# Patient Record
Sex: FEMALE | Race: WHITE | NOT HISPANIC OR LATINO | Employment: UNEMPLOYED | URBAN - METROPOLITAN AREA
[De-identification: names, ages, dates, MRNs, and addresses within clinical notes are randomized per-mention and may not be internally consistent; named-entity substitution may affect disease eponyms.]

---

## 2023-08-28 ENCOUNTER — HOSPITAL ENCOUNTER (OUTPATIENT)
Dept: DATA CONVERSION | Facility: HOSPITAL | Age: 68
End: 2023-08-28

## 2023-09-23 PROBLEM — M79.2 NEUROPATHIC PAIN: Status: ACTIVE | Noted: 2023-09-23

## 2023-09-23 PROBLEM — G89.29 CHRONIC IDIOPATHIC PAIN SYNDROME: Status: ACTIVE | Noted: 2023-09-23

## 2023-09-23 PROBLEM — F45.42 PAIN DISORDER ASSOCIATED WITH PSYCHOLOGICAL AND PHYSICAL FACTORS: Status: ACTIVE | Noted: 2023-09-23

## 2023-09-23 PROBLEM — K44.9 HIATAL HERNIA: Status: ACTIVE | Noted: 2023-09-23

## 2023-09-23 PROBLEM — I87.1 MAY-THURNER SYNDROME: Status: ACTIVE | Noted: 2023-09-23

## 2023-09-23 PROBLEM — N94.89 PELVIC CONGESTION SYNDROME: Status: ACTIVE | Noted: 2023-09-23

## 2023-09-23 PROBLEM — R29.2 BRISK DEEP TENDON REFLEXES: Status: ACTIVE | Noted: 2023-09-23

## 2023-09-23 PROBLEM — M81.0 OSTEOPOROSIS: Status: ACTIVE | Noted: 2023-09-23

## 2023-09-23 PROBLEM — G90.529: Status: ACTIVE | Noted: 2023-09-23

## 2023-09-23 PROBLEM — M54.10 RADICULOPATHY OF LEG: Status: ACTIVE | Noted: 2023-09-23

## 2023-09-23 PROBLEM — G57.02 PIRIFORMIS SYNDROME OF LEFT SIDE: Status: ACTIVE | Noted: 2023-09-23

## 2023-09-23 PROBLEM — G57.72 COMPLEX REGIONAL PAIN SYNDROME TYPE 2 OF LEFT LOWER EXTREMITY: Status: ACTIVE | Noted: 2023-09-23

## 2023-09-23 PROBLEM — F32.A DEPRESSIVE EPISODE: Status: ACTIVE | Noted: 2023-09-23

## 2023-09-23 RX ORDER — DULOXETIN HYDROCHLORIDE 30 MG/1
30 CAPSULE, DELAYED RELEASE ORAL DAILY
COMMUNITY
Start: 2023-06-01 | End: 2024-01-29 | Stop reason: WASHOUT

## 2023-09-23 RX ORDER — LISINOPRIL 10 MG/1
10 TABLET ORAL AS NEEDED
COMMUNITY
Start: 2021-01-28 | End: 2023-12-12 | Stop reason: ALTCHOICE

## 2023-09-23 RX ORDER — ACETAMINOPHEN 500 MG
1 TABLET ORAL EVERY MORNING
COMMUNITY

## 2023-09-23 RX ORDER — GUAIFENESIN 1200 MG
325 TABLET, EXTENDED RELEASE 12 HR ORAL
COMMUNITY

## 2023-09-23 RX ORDER — METOPROLOL TARTRATE 25 MG/1
12.5 TABLET, FILM COATED ORAL 2 TIMES DAILY
COMMUNITY
Start: 2022-12-06 | End: 2023-12-15 | Stop reason: SDUPTHER

## 2023-09-23 RX ORDER — LISINOPRIL 5 MG/1
2.5 TABLET ORAL EVERY MORNING
COMMUNITY
Start: 2023-03-16 | End: 2023-12-12 | Stop reason: SINTOL

## 2023-09-23 RX ORDER — DULOXETIN HYDROCHLORIDE 20 MG/1
20 CAPSULE, DELAYED RELEASE ORAL DAILY
COMMUNITY
Start: 2023-09-08 | End: 2024-01-29 | Stop reason: WASHOUT

## 2023-09-23 RX ORDER — LORAZEPAM 0.5 MG/1
0.5 TABLET ORAL 3 TIMES DAILY PRN
COMMUNITY
Start: 2022-02-03

## 2023-09-23 RX ORDER — GABAPENTIN 100 MG/1
CAPSULE ORAL 2 TIMES DAILY
COMMUNITY
Start: 2020-12-17

## 2023-09-23 RX ORDER — MULTIVITAMIN
1 TABLET ORAL EVERY MORNING
COMMUNITY

## 2023-10-09 ENCOUNTER — OFFICE VISIT (OUTPATIENT)
Dept: PAIN MEDICINE | Facility: CLINIC | Age: 68
End: 2023-10-09
Payer: COMMERCIAL

## 2023-10-09 VITALS
BODY MASS INDEX: 19.67 KG/M2 | HEIGHT: 63 IN | DIASTOLIC BLOOD PRESSURE: 76 MMHG | RESPIRATION RATE: 16 BRPM | SYSTOLIC BLOOD PRESSURE: 142 MMHG | WEIGHT: 111 LBS

## 2023-10-09 DIAGNOSIS — G03.9 ARACHNOIDITIS (HHS-HCC): Primary | ICD-10-CM

## 2023-10-09 DIAGNOSIS — M53.3 SACRAL PAIN: ICD-10-CM

## 2023-10-09 DIAGNOSIS — G96.191 TARLOV CYST: ICD-10-CM

## 2023-10-09 DIAGNOSIS — G90.529 COMPLEX REGIONAL PAIN SYNDROME TYPE 1 AFFECTING PELVIC REGION AND THIGH: ICD-10-CM

## 2023-10-09 DIAGNOSIS — G89.29 OTHER CHRONIC PAIN: ICD-10-CM

## 2023-10-09 PROCEDURE — 99215 OFFICE O/P EST HI 40 MIN: CPT | Performed by: ANESTHESIOLOGY

## 2023-10-09 PROCEDURE — 1159F MED LIST DOCD IN RCRD: CPT | Performed by: ANESTHESIOLOGY

## 2023-10-09 PROCEDURE — 1036F TOBACCO NON-USER: CPT | Performed by: ANESTHESIOLOGY

## 2023-10-09 PROCEDURE — 1160F RVW MEDS BY RX/DR IN RCRD: CPT | Performed by: ANESTHESIOLOGY

## 2023-10-09 PROCEDURE — 1125F AMNT PAIN NOTED PAIN PRSNT: CPT | Performed by: ANESTHESIOLOGY

## 2023-10-09 ASSESSMENT — ENCOUNTER SYMPTOMS
PSYCHIATRIC NEGATIVE: 1
CONSTITUTIONAL NEGATIVE: 1
RESPIRATORY NEGATIVE: 1
HEMATOLOGIC/LYMPHATIC NEGATIVE: 1
ALLERGIC/IMMUNOLOGIC NEGATIVE: 1
ENDOCRINE NEGATIVE: 1
GASTROINTESTINAL NEGATIVE: 1
CARDIOVASCULAR NEGATIVE: 1
EYES NEGATIVE: 1

## 2023-10-09 ASSESSMENT — PAIN - FUNCTIONAL ASSESSMENT: PAIN_FUNCTIONAL_ASSESSMENT: 0-10

## 2023-10-09 ASSESSMENT — PATIENT HEALTH QUESTIONNAIRE - PHQ9
SUM OF ALL RESPONSES TO PHQ9 QUESTIONS 1 AND 2: 0
2. FEELING DOWN, DEPRESSED OR HOPELESS: NOT AT ALL
1. LITTLE INTEREST OR PLEASURE IN DOING THINGS: NOT AT ALL

## 2023-10-09 ASSESSMENT — PAIN SCALES - GENERAL
PAINLEVEL_OUTOF10: 7
PAINLEVEL: 7
PAINLEVEL_OUTOF10: 7

## 2023-10-09 ASSESSMENT — LIFESTYLE VARIABLES: TOTAL SCORE: 0

## 2023-10-09 NOTE — PROGRESS NOTES
Subjective   Patient ID: Lovely Luna is a 68 y.o. female who presents for left buttock pain.  HPI  Patient here today for a new patient evalaution of her left buttock, sacral, and left pain and numbness.  In 2018 she had a L3/4 disc herniation and she had MORTEZA with Dr. Riley as well as PT and she started to get better.  The groin and anterior thigh pain got better.  However, she started having left buttock painthis point.  She did a significant amount of PT.  She had a left piriformis injection with Dr. Jones, which was not helpful.  She developed swelling over the left gluteal area.  She transferred over to [facility] and was seeing Dr. Gordon.  She had a Middle Cluneal nerve block as well as a S2 nerve block.  She also had a glute med block as well and she pain free for 30 min.  She got worse after the second injection.  Dr. Gordon then suggested a second opinion.  She then saw Dr. Silvestre Razo got a new MRI which did show a arachnoiditis which was not present before the the MORTEZA she had by Dr. Riley.  She had a new Mri and had S1 and S2 tarlov cysts on the left side.  She went to [place] and saw Dr. Teran who did offer her a large surgery because of the amount of pain.      Her pain is a majority in the buttock which is medial close to the sacrum and down the medial buttock.  She also has some pain and tightness int he left lateral thigh into the lateral thigh.  She has limited hip ROM.  She has very limited hip flexion at this point.  She has had extensive MRI and no other pathology has been seen.  She had laser therapy and it was not helpful.  She had a consult with Dr. Looney and discussed SCS with her.     She has tried numerous medications including several antieptleptics and muscle relaxants.       Review of Systems   Constitutional: Negative.    HENT: Negative.     Eyes: Negative.    Respiratory: Negative.     Cardiovascular: Negative.    Gastrointestinal: Negative.    Endocrine: Negative.     Genitourinary: Negative.    Musculoskeletal:  Positive for arthralgias, joint swelling and myalgias.   Skin: Negative.    Allergic/Immunologic: Negative.    Neurological:  Positive for weakness and numbness.   Hematological: Negative.    Psychiatric/Behavioral: Negative.         Objective   Physical Exam  Vitals and nursing note reviewed.   Constitutional:       Appearance: Normal appearance.      Comments: Patient is laying down on exam table on right side appears to be in discomfort.   HENT:      Head: Normocephalic and atraumatic.      Right Ear: Ear canal and external ear normal.      Left Ear: Ear canal and external ear normal.      Nose: Nose normal.      Mouth/Throat:      Mouth: Mucous membranes are moist.      Pharynx: Oropharynx is clear.   Eyes:      Conjunctiva/sclera: Conjunctivae normal.      Pupils: Pupils are equal, round, and reactive to light.   Cardiovascular:      Rate and Rhythm: Normal rate.   Pulmonary:      Effort: Pulmonary effort is normal. No respiratory distress.   Musculoskeletal:      Cervical back: Normal range of motion and neck supple.      Lumbar back: Swelling, spasms, tenderness (over the left lateral sacrum) and bony tenderness present. Normal range of motion. No scoliosis.      Left hip: Deformity (large swollen area over the left lateral hip and lateral thigh) and tenderness present. Decreased range of motion. Decreased strength.      Left upper leg: Swelling present.        Legs:       Comments: There is myofascial tightness and spasm of the gluteal musculature.  Not present on the right side.   Skin:     General: Skin is warm and dry.   Neurological:      Mental Status: She is alert.   Psychiatric:         Mood and Affect: Mood normal.         Thought Content: Thought content normal.       Assessment/Plan   Problem List Items Addressed This Visit             ICD-10-CM       Neuro    Complex regional pain syndrome type 1 affecting pelvic region and thigh G90.529     Other  Visit Diagnoses         Codes    Arachnoiditis    -  Primary G03.9    Sacral pain     M53.3    Tarlov cyst     G96.191    Other chronic pain     G89.29             I nice discussion with the patient today our plan will be as follows.    Radiology: All available imaging was reviewed today.  She did have a lumbar MRI that was completed at [facility] that should show some clumping of the cauda equina with potential arachnoiditis.  There are some small Tarlov cyst seen on the left side at S2 and S3.  She has had extensive imaging of the hip cervical spine as well as the brain.  As well as EMG imaging.  She also had extensive neuro muscular ultrasound completed by the  of radiology at [facility].    Physically: Patient has been through an extensive amount of physical therapy including acupuncture and red laser therapy.  She continues to go to the swimming pool 3 times per week for 30 to 45 minutes as well as attempting to walk half a mile to 1 mile per day.    Psychologically: No issues at this time.    Medication: We did go over all of her medications and she has been tried on many different neuropathic's including gabapentin/pregabalin she has not tried topiramate.  She is been on different muscle relaxants as well as different antidepressants.    Duration: Approximately 2 years.    Intervention:  Patient is excellent candidate for scrambler therapy.  Risks, benefit, and alternatives of the procedure were discussed with the patient.  Oswestry score has been compelted and recorded.  Dermatome mapping: Left L4, L5, S1, S2.    Also discussed neuromodulation and we discussed dorsal column systems versus DRG.  I did invite her to come back and speak with me if she would like to discuss this in the future.

## 2023-10-10 ASSESSMENT — ENCOUNTER SYMPTOMS
MYALGIAS: 1
NUMBNESS: 1
WEAKNESS: 1
ARTHRALGIAS: 1
JOINT SWELLING: 1

## 2023-11-10 ENCOUNTER — APPOINTMENT (OUTPATIENT)
Dept: RADIOLOGY | Facility: CLINIC | Age: 68
End: 2023-11-10
Payer: COMMERCIAL

## 2023-11-13 ENCOUNTER — HOSPITAL ENCOUNTER (OUTPATIENT)
Dept: RADIOLOGY | Facility: HOSPITAL | Age: 68
Discharge: HOME | End: 2023-11-13
Payer: COMMERCIAL

## 2023-11-13 DIAGNOSIS — N95.1 MENOPAUSAL AND FEMALE CLIMACTERIC STATES: ICD-10-CM

## 2023-11-13 PROCEDURE — 77085 DXA BONE DENSITY AXL VRT FX: CPT

## 2023-11-13 PROCEDURE — 77085 DXA BONE DENSITY AXL VRT FX: CPT | Performed by: RADIOLOGY

## 2023-12-12 ENCOUNTER — OFFICE VISIT (OUTPATIENT)
Dept: CARDIOLOGY | Facility: CLINIC | Age: 68
End: 2023-12-12
Payer: COMMERCIAL

## 2023-12-12 VITALS
WEIGHT: 110 LBS | OXYGEN SATURATION: 99 % | SYSTOLIC BLOOD PRESSURE: 138 MMHG | DIASTOLIC BLOOD PRESSURE: 80 MMHG | HEIGHT: 63 IN | BODY MASS INDEX: 19.49 KG/M2 | HEART RATE: 66 BPM

## 2023-12-12 DIAGNOSIS — I48.0 PAROXYSMAL ATRIAL FIBRILLATION (MULTI): Primary | ICD-10-CM

## 2023-12-12 DIAGNOSIS — I10 PRIMARY HYPERTENSION: ICD-10-CM

## 2023-12-12 PROCEDURE — 1036F TOBACCO NON-USER: CPT | Performed by: STUDENT IN AN ORGANIZED HEALTH CARE EDUCATION/TRAINING PROGRAM

## 2023-12-12 PROCEDURE — 3075F SYST BP GE 130 - 139MM HG: CPT | Performed by: STUDENT IN AN ORGANIZED HEALTH CARE EDUCATION/TRAINING PROGRAM

## 2023-12-12 PROCEDURE — 99215 OFFICE O/P EST HI 40 MIN: CPT | Performed by: STUDENT IN AN ORGANIZED HEALTH CARE EDUCATION/TRAINING PROGRAM

## 2023-12-12 PROCEDURE — 3079F DIAST BP 80-89 MM HG: CPT | Performed by: STUDENT IN AN ORGANIZED HEALTH CARE EDUCATION/TRAINING PROGRAM

## 2023-12-12 PROCEDURE — 1159F MED LIST DOCD IN RCRD: CPT | Performed by: STUDENT IN AN ORGANIZED HEALTH CARE EDUCATION/TRAINING PROGRAM

## 2023-12-12 PROCEDURE — 1125F AMNT PAIN NOTED PAIN PRSNT: CPT | Performed by: STUDENT IN AN ORGANIZED HEALTH CARE EDUCATION/TRAINING PROGRAM

## 2023-12-12 PROCEDURE — 1160F RVW MEDS BY RX/DR IN RCRD: CPT | Performed by: STUDENT IN AN ORGANIZED HEALTH CARE EDUCATION/TRAINING PROGRAM

## 2023-12-12 RX ORDER — LOSARTAN POTASSIUM 25 MG/1
12.5 TABLET ORAL DAILY
Qty: 45 TABLET | Refills: 3 | Status: SHIPPED | OUTPATIENT
Start: 2023-12-12 | End: 2024-12-11

## 2023-12-12 NOTE — PROGRESS NOTES
Follow-up/No chief complaint on file.     HPI:    Lovely Luna is a 68 y.o. female with pertinent history of chronic pain syndrome, varicose veins, May Thurner syndrome, left bundle branch block on ECG performed 11/28/2022, preserved EF on echo 12/5/22, no clear ischemia on nuclear pharmacologic stress performed 12/5/22 that was complicated by by approximately 1-2 minutes of persistent SVT likely atrial fibrillation with rate of 170 bpm, less than 1% were about 59 minutes of atrial fibrillation burden and event monitor performed December 2022, atrial fibrillation on Eliquis presents to cardiology clinic for follow-up.    She is accompanied by her  who is a physician.  She is doing relatively well but continues to suffer from back pain.  Due to prolonged discussion about her recent event monitor and the natural history of atrial fibrillation.  She has been compliant with Eliquis would like to get off of it.  She would consider left atrial appendage closure device such as watchman.  She developed a chronic cough on lisinopril and we discussed alternatives.  She has minimal palpitations.  No exacerbating or relieving factors.  Patient denies chest pain and angina.  Pt denies orthopnea, and paroxysmal nocturnal dyspnea.  Pt denies worsening lower extremity edema.  Pt denies syncope.  No recent falls.  No fever or chills.  No cough.  No change in bowel or bladder habits.  No sick contacts.  No recent travel.    12 point review of systems including (Constitutional, Eyes, ENMT, Respiratory, Cardiac, Gastrointestinal, Neurological, Psychiatric, and Hematologic) was performed and is otherwise negative.    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    Past surgical history reviewed:   has a past surgical history that includes  section, classic (2017); Oophorectomy (2017); Laparoscopy diagnostic / biopsy / aspiration / lysis (2018); Other surgical history (2018); and MR angio pelvis w and wo IV contrast (2021).    Social history reviewed:   reports that she has never smoked. She has never used smokeless tobacco. She reports that she does not drink alcohol and does not use drugs.     Family history reviewed:    Family History   Problem Relation Name Age of Onset    Stroke Mother      Coronary artery disease Father      Cancer Father         Allergies reviewed: Patient has no known allergies.     Medications reviewed:   Current Outpatient Medications   Medication Instructions    acetaminophen (TYLENOL) 325 mg, oral    apixaban (ELIQUIS) 5 mg, oral, 2 times daily    cholecalciferol (Vitamin D-3) 50 mcg (2,000 unit) capsule 1 capsule, oral, Every morning    DENOSUMAB SUBQ 1 mL, subcutaneous, Every 6 months    DULoxetine (CYMBALTA) 20 mg, oral, Daily    DULoxetine (CYMBALTA) 30 mg, oral, Daily    gabapentin (Neurontin) 100 mg capsule oral, 2 times daily, TAKE 2 CAPSULES BY MOUTH TWICE DAILY and TAKE 3 CAPSULES BY MOUTH EVERY NIGHT AT BEDTIME<BR>    lisinopril 10 mg, oral, As needed    lisinopril 2.5 mg, oral, Every morning    LORazepam (ATIVAN) 0.5 mg, oral, 3 times daily PRN    metoprolol tartrate (LOPRESSOR) 12.5 mg, oral, 2 times daily, Take 25 mg by mouth in the morning and 25 mg before bedtime.<BR>    multivitamin tablet 1 tablet, oral, Every morning        Vitals reviewed: Visit Vitals  /80   Pulse 66       Physical  "Exam:   General:  Patient is awake, alert, and oriented.  Patient is in no acute distress.  HEENT:  Pupils equal and reactive.  Normocephalic.  Moist mucosa.    Neck:  No thyromegaly.  Normal Jugular Venous Pressure.  Cardiovascular:  Regular rate and rhythm.  Normal S1 and S2.  1/6 WILMAN.  Pulmonary:  Clear to auscultation bilaterally.  Abdomen:  Soft. Non-tender.   Non-distended.  Positive bowel sounds.  Lower Extremities:  2+ pedal pulses. No LE edema.  Neurologic:  Cranial nerves intact.  No focal deficit.   Skin: Skin warm and dry, normal skin turgor.   Psychiatric: Normal affect.    Last Labs:  CBC -      Lab Results   Component Value Date    WBC 11.3 11/28/2022    HGB 14.1 11/28/2022    HCT 42.4 11/28/2022     11/28/2022        CMP-  Lab Results   Component Value Date    GLUCOSE 85 11/28/2022     11/28/2022    K 4.5 11/28/2022     11/28/2022    CO2 30 11/28/2022    ANIONGAP 12 11/28/2022    BUN 10 11/28/2022    CREATININE 0.55 11/28/2022    CALCIUM 9.2 11/28/2022    PROT 6.0 (L) 05/09/2018    ALBUMIN 3.8 05/09/2018    AST 50 (H) 05/09/2018    ALT 56 (H) 05/09/2018    ALKPHOS 50 05/09/2018    BILITOT 1.0 05/09/2018        LIPIDS-  No results found for: \"CHOL\", \"TRIG\", \"HDL\", \"CHHDL\", \"VLDL\"     OTHERS-  Lab Results   Component Value Date    BNP 43 12/06/2022        I personally reviewed the patient's recent vitals, labs, medications, orders, EKGs, pertinent cardiac imaging/ echocardiography and event monitor.    Assessment and Plan:    Lovely Luna is a 68 y.o. female with pertinent history of chronic pain syndrome, varicose veins, May Thurner syndrome, left bundle branch block on ECG performed 11/28/2022, preserved EF on echo 12/5/22, no clear ischemia on nuclear pharmacologic stress performed 12/5/22 that was complicated by by approximately 1-2 minutes of persistent SVT likely atrial fibrillation with rate of 170 bpm, less than 1% were about 59 minutes of atrial fibrillation burden " and event monitor performed December 2022, atrial fibrillation on Eliquis presents to cardiology clinic for follow-up.  She is accompanied by her  who is a physician.  She is doing relatively well but continues to suffer from back pain.  Due to prolonged discussion about her recent event monitor and the natural history of atrial fibrillation.  She has been compliant with Eliquis would like to get off of it.  She would consider left atrial appendage closure device such as watchman.  She developed a chronic cough on lisinopril and we discussed alternatives.  She has minimal palpitations.      We will arrange for follow-up with the structural heart team to discuss left atrial appendage closure/Watchman device.    We will plan to discontinue lisinopril and start losartan half of a tablet or 12.5 mg once daily.    For now, please continue Eliquis 5 mg twice daily and Toprol tartrate 12.5 mg twice daily.    Please followup with me in Cardiology clinic within the next 6 to 7 months.  Please return to clinic sooner or seek emergent care if your symptoms reoccur or worsen.    Thank you for allowing me to participate in their care.  Please feel free to call me with any further questions or concerns.        Silvestre Rico MD, FACC, [facility]  Division of Cardiovascular Medicine  Medical Director, [facility]  [facility]  Assistant Clinical Professor, Medicine  [employer]  [email or web address]  Office:  [phone]

## 2023-12-12 NOTE — PATIENT INSTRUCTIONS
We will arrange for follow-up with the structural heart team to discuss left atrial appendage closure/Watchman device.    We will plan to discontinue lisinopril and start losartan half of a tablet or 12.5 mg once daily.    For now, please continue Eliquis 5 mg twice daily and Toprol tartrate 12.5 mg twice daily.    Please followup with me in Cardiology clinic within the next 6 to 7 months.  Please return to clinic sooner or seek emergent care if your symptoms reoccur or worsen.

## 2023-12-15 DIAGNOSIS — I48.0 PAROXYSMAL ATRIAL FIBRILLATION (MULTI): Primary | ICD-10-CM

## 2023-12-15 RX ORDER — METOPROLOL TARTRATE 25 MG/1
12.5 TABLET, FILM COATED ORAL 2 TIMES DAILY
Qty: 90 TABLET | Refills: 3 | Status: SHIPPED | OUTPATIENT
Start: 2023-12-15 | End: 2024-12-14

## 2023-12-18 ENCOUNTER — TELEPHONE (OUTPATIENT)
Dept: CARDIOLOGY | Facility: HOSPITAL | Age: 68
End: 2023-12-18
Payer: COMMERCIAL

## 2024-01-28 NOTE — PROGRESS NOTES
"Subjective   Patient ID: Lovely Luna is a 68 y.o. female who presents for Scrambler Therapy.    HPI 67 YO Female with a longstanding history of Arachnoiditis, CRPS of the pelvis and thigh, Sacral Pain, Tarlov Cysts and LLE pain and numbness. She has tried multiple interventions (injections, PT, etc) to help with the pain but has not had any lasting success. Lovely presents today to start Scrambler Therapy during which we will be targeting the left sided L4, L5, S1 and S2.     Review of Systems Unless noted in the HPI all other systems have been reviewed and are negative for complaint    Objective   Physical Exam  General- No acute distress, well appearing and well nourished.   Eyes Conjunctiva and lids: No erythema, swelling or discharge  Neck - Supple, no cervical lymphadenopathy.   Pulmonary - Respiratory effort: Normal respiration.   Cardiovascular - Normal rate and rhythm.  Examination of extremities for edema and/or varicosities: No peripheral edema  Abdomen: Soft, Non-tender, non-distended, no abdominal masses.   Musculoskeletal - Lumbar spine with spasms and tender to palpation; especially over the sacrum. Left hip pain and tenderness noted to the the lateral hip and thigh. Decreased ROM and strength noted. Edema to the LLE noted.   Skin - Skin and subcutaneous tissue: Normal without rashes or lesions.  Neurologic - Reflexes: Normal. Coordination: Normal gait   Psychiatric - Orientation to person, place, and time: Normal. Mood and affect: Normal.    Assessment/Plan   Problem List Items Addressed This Visit       Chronic idiopathic pain syndrome    Complex regional pain syndrome type 2 of left lower extremity - Primary     TREATMENT PLAN:  Day 1 of Scrambler Therapy.   Verbal consent obtained.  Session initiated by myself with proper electrode/lead placement to applicable dermatomes.   Patient tolerated treatment well (see \"procedure note\" below).  Encouraged patient to keep a diary or log of " "symptoms to monitor for any changes in pain, sensation, etc.   >30 minutes spent face-to-face with patient.     PROCEDURE NOTE:  Treatment 1 with Scrambler therapy was initiated by myself.   Verbal consent obtained from patient.  Therapy start time: 1330  Leads were applied to:  LLE:  Anterior above the knee cap and lateral thigh (L4); therapeutic dose 1100.  Base of great toe and ball of foot (L5); therapeutic dose 1500.  Lateral side of foot and lateral posterior calf (S1); therapeutic dose 1500.  Left lateral buttock, anterior-mid thigh(S2); therapeutic dose 1200.   Therapy end time: 1430  Patient tolerated treatment well.  Pain reported at start of therapy session was about a 7/10.  Pain reported as \"about the same´´ at end of therapy session.  Leads were removed and patient left the office without any difficulty.  Patient will return tomorrow for additional treatment.   60 min total scrambler treatment time.    "

## 2024-01-29 ENCOUNTER — OFFICE VISIT (OUTPATIENT)
Dept: PAIN MEDICINE | Facility: CLINIC | Age: 69
End: 2024-01-29
Payer: MEDICARE

## 2024-01-29 VITALS
DIASTOLIC BLOOD PRESSURE: 78 MMHG | WEIGHT: 113 LBS | SYSTOLIC BLOOD PRESSURE: 142 MMHG | HEART RATE: 72 BPM | HEIGHT: 63 IN | BODY MASS INDEX: 20.02 KG/M2

## 2024-01-29 DIAGNOSIS — G57.72 COMPLEX REGIONAL PAIN SYNDROME TYPE 2 OF LEFT LOWER EXTREMITY: Primary | ICD-10-CM

## 2024-01-29 DIAGNOSIS — G89.29 CHRONIC IDIOPATHIC PAIN SYNDROME: ICD-10-CM

## 2024-01-29 PROBLEM — G03.9 ARACHNOIDITIS (HHS-HCC): Status: ACTIVE | Noted: 2024-01-29

## 2024-01-29 PROCEDURE — 1036F TOBACCO NON-USER: CPT | Performed by: NURSE PRACTITIONER

## 2024-01-29 PROCEDURE — 1125F AMNT PAIN NOTED PAIN PRSNT: CPT | Performed by: NURSE PRACTITIONER

## 2024-01-29 PROCEDURE — 99215 OFFICE O/P EST HI 40 MIN: CPT | Performed by: NURSE PRACTITIONER

## 2024-01-29 PROCEDURE — 1159F MED LIST DOCD IN RCRD: CPT | Performed by: NURSE PRACTITIONER

## 2024-01-29 ASSESSMENT — PAIN DESCRIPTION - DESCRIPTORS: DESCRIPTORS: ACHING;BURNING

## 2024-01-29 ASSESSMENT — PAIN SCALES - GENERAL: PAINLEVEL_OUTOF10: 7

## 2024-01-29 ASSESSMENT — PATIENT HEALTH QUESTIONNAIRE - PHQ9
2. FEELING DOWN, DEPRESSED OR HOPELESS: NOT AT ALL
SUM OF ALL RESPONSES TO PHQ9 QUESTIONS 1 AND 2: 0
1. LITTLE INTEREST OR PLEASURE IN DOING THINGS: NOT AT ALL

## 2024-01-29 ASSESSMENT — PAIN - FUNCTIONAL ASSESSMENT: PAIN_FUNCTIONAL_ASSESSMENT: 0-10

## 2024-01-29 NOTE — PROGRESS NOTES
Subjective   Patient ID: Lovely Luna is a 68 y.o. female who presents for Scrambler Therapy.    HPI 69 YO Female with a longstanding history of Arachnoiditis, CRPS of the pelvis and thigh, Sacral Pain, Tarlov Cysts and LLE pain and numbness. She has tried multiple interventions (injections, PT, etc) to help with the pain but has not had any lasting success. Lovely presents today for her 2nd Scrambler Therapy during which we will be targeting the left sided L4, L5, S1 and S2.  She has not yet noticed any changes in pain, in fact she reports that she has had some additional pain noted to the inner aspect of the left foot wrapping around to the ball of the foot.     Review of Systems Unless noted in the HPI all other systems have been reviewed and are negative for complaint    Objective   Physical Exam  General- No acute distress, well appearing and well nourished.   Eyes Conjunctiva and lids: No erythema, swelling or discharge  Neck - Supple, no cervical lymphadenopathy.   Pulmonary - Respiratory effort: Normal respiration.   Cardiovascular - Normal rate and rhythm.  Examination of extremities for edema and/or varicosities: No peripheral edema  Abdomen: Soft, Non-tender, non-distended, no abdominal masses.   Musculoskeletal - Lumbar spine with spasms and tender to palpation; especially over the sacrum. Left hip pain and tenderness noted to the the lateral hip and thigh. Decreased ROM and strength noted. Edema to the LLE noted.   Skin - Skin and subcutaneous tissue: Normal without rashes or lesions.  Neurologic - Reflexes: Normal. Coordination: Normal gait   Psychiatric - Orientation to person, place, and time: Normal. Mood and affect: Normal.    Assessment/Plan   Problem List Items Addressed This Visit       Chronic idiopathic pain syndrome    Complex regional pain syndrome type 2 of left lower extremity - Primary     TREATMENT PLAN:  Day 2 of Scrambler Therapy.   Verbal consent obtained.  Session initiated  "by myself with proper electrode/lead placement to applicable dermatomes.   Patient tolerated treatment well (see \"procedure note\" below).  Encouraged patient to keep a diary or log of symptoms to monitor for any changes in pain, sensation, etc.   >30 minutes spent face-to-face with patient.     PROCEDURE NOTE:  Treatment 2 with Scrambler therapy was initiated by myself.   Verbal consent obtained from patient.  Therapy start time: 1330  Leads were applied to:  LLE:  Anterior above the knee cap and directly below knee cap (L4); therapeutic dose 1200.  Top of foot and lateral leg above malleolus (L5); therapeutic dose 1500.  Lateral side left more proximal to knee and posterior calf (S1); therapeutic dose 1500.  Left lateral buttock, anterior-mid thigh(S2); therapeutic dose 1200.   Therapy end time: 1430  Patient tolerated treatment well.  Pain reported at start of therapy session was about a 7/10.  Pain reported as \"about the same´´ at end of therapy session.  Leads were removed and patient left the office without any difficulty.  Patient will return tomorrow for additional treatment.   60 min total scrambler treatment time.    "

## 2024-01-30 ENCOUNTER — OFFICE VISIT (OUTPATIENT)
Dept: PAIN MEDICINE | Facility: CLINIC | Age: 69
End: 2024-01-30
Payer: MEDICARE

## 2024-01-30 VITALS
SYSTOLIC BLOOD PRESSURE: 129 MMHG | BODY MASS INDEX: 20.02 KG/M2 | DIASTOLIC BLOOD PRESSURE: 83 MMHG | WEIGHT: 113 LBS | HEIGHT: 63 IN | HEART RATE: 85 BPM

## 2024-01-30 DIAGNOSIS — G57.72 COMPLEX REGIONAL PAIN SYNDROME TYPE 2 OF LEFT LOWER EXTREMITY: Primary | ICD-10-CM

## 2024-01-30 DIAGNOSIS — G03.9 ARACHNOIDITIS (HHS-HCC): ICD-10-CM

## 2024-01-30 PROCEDURE — 99215 OFFICE O/P EST HI 40 MIN: CPT | Performed by: NURSE PRACTITIONER

## 2024-01-30 PROCEDURE — 1036F TOBACCO NON-USER: CPT | Performed by: NURSE PRACTITIONER

## 2024-01-30 PROCEDURE — 1125F AMNT PAIN NOTED PAIN PRSNT: CPT | Performed by: NURSE PRACTITIONER

## 2024-01-30 PROCEDURE — 1159F MED LIST DOCD IN RCRD: CPT | Performed by: NURSE PRACTITIONER

## 2024-01-30 ASSESSMENT — PAIN SCALES - GENERAL: PAINLEVEL_OUTOF10: 7

## 2024-01-30 ASSESSMENT — PAIN DESCRIPTION - DESCRIPTORS: DESCRIPTORS: ACHING

## 2024-01-31 ENCOUNTER — OFFICE VISIT (OUTPATIENT)
Dept: PAIN MEDICINE | Facility: CLINIC | Age: 69
End: 2024-01-31
Payer: MEDICARE

## 2024-01-31 VITALS
BODY MASS INDEX: 20.02 KG/M2 | DIASTOLIC BLOOD PRESSURE: 79 MMHG | HEART RATE: 84 BPM | SYSTOLIC BLOOD PRESSURE: 130 MMHG | WEIGHT: 113 LBS | HEIGHT: 63 IN

## 2024-01-31 DIAGNOSIS — M79.2 NEUROPATHIC PAIN: ICD-10-CM

## 2024-01-31 DIAGNOSIS — G03.9 ARACHNOIDITIS (HHS-HCC): ICD-10-CM

## 2024-01-31 DIAGNOSIS — G57.72 COMPLEX REGIONAL PAIN SYNDROME TYPE 2 OF LEFT LOWER EXTREMITY: Primary | ICD-10-CM

## 2024-01-31 PROCEDURE — 1125F AMNT PAIN NOTED PAIN PRSNT: CPT | Performed by: NURSE PRACTITIONER

## 2024-01-31 PROCEDURE — 1036F TOBACCO NON-USER: CPT | Performed by: NURSE PRACTITIONER

## 2024-01-31 PROCEDURE — 1159F MED LIST DOCD IN RCRD: CPT | Performed by: NURSE PRACTITIONER

## 2024-01-31 PROCEDURE — 99215 OFFICE O/P EST HI 40 MIN: CPT | Performed by: NURSE PRACTITIONER

## 2024-01-31 ASSESSMENT — PATIENT HEALTH QUESTIONNAIRE - PHQ9
SUM OF ALL RESPONSES TO PHQ9 QUESTIONS 1 AND 2: 0
1. LITTLE INTEREST OR PLEASURE IN DOING THINGS: NOT AT ALL
2. FEELING DOWN, DEPRESSED OR HOPELESS: NOT AT ALL

## 2024-01-31 ASSESSMENT — PAIN SCALES - GENERAL: PAINLEVEL_OUTOF10: 5 - MODERATE PAIN

## 2024-01-31 ASSESSMENT — PAIN DESCRIPTION - DESCRIPTORS: DESCRIPTORS: BURNING;NUMBNESS;TINGLING

## 2024-01-31 NOTE — PROGRESS NOTES
Subjective   Patient ID: Lovely Luna is a 68 y.o. female who presents for Scrambler Therapy.    HPI 67 YO Female with a longstanding history of Arachnoiditis, CRPS of the pelvis and thigh, Sacral Pain, Tarlov Cysts and LLE pain and numbness. She has tried multiple interventions (injections, PT, etc) to help with the pain but has not had any lasting success. Lovely presents today for her 3rd Scrambler Therapy during which we will be targeting the left sided L4, L5, S1 and S2. She does report that she has a slight decrease in allodynia to the lateral aspect of the LLE.     Review of Systems Unless noted in the HPI all other systems have been reviewed and are negative for complaint    Objective   Physical Exam  General- No acute distress, well appearing and well nourished.   Eyes Conjunctiva and lids: No erythema, swelling or discharge  Neck - Supple, no cervical lymphadenopathy.   Pulmonary - Respiratory effort: Normal respiration.   Cardiovascular - Normal rate and rhythm.  Examination of extremities for edema and/or varicosities: No peripheral edema  Abdomen: Soft, Non-tender, non-distended, no abdominal masses.   Musculoskeletal - Lumbar spine with spasms and tender to palpation; especially over the sacrum. Left hip pain and tenderness noted to the the lateral hip and thigh. Decreased ROM and strength noted. Edema to the LLE noted.   Skin - Skin and subcutaneous tissue: Normal without rashes or lesions.  Neurologic - Reflexes: Normal. Coordination: Normal gait   Psychiatric - Orientation to person, place, and time: Normal. Mood and affect: Normal.    Assessment/Plan   Problem List Items Addressed This Visit       Chronic idiopathic pain syndrome    Complex regional pain syndrome type 2 of left lower extremity - Primary     TREATMENT PLAN:  Day 3 of Scrambler Therapy.   Verbal consent obtained.  Session initiated by myself with proper electrode/lead placement to applicable dermatomes.   Patient tolerated  "treatment well (see \"procedure note\" below).  Encouraged patient to keep a diary or log of symptoms to monitor for any changes in pain, sensation, etc.   >30 minutes spent face-to-face with patient.     PROCEDURE NOTE:  Treatment 3 with Scrambler therapy was initiated by myself.   Verbal consent obtained from patient.  Therapy start time: 1315  Leads were applied to:  LLE:  Anterior above the knee cap and directly below knee cap (L4); therapeutic dose 1200.  Top of foot and lateral leg above malleolus (L5); therapeutic dose 1300.  Lateral side left more proximal to knee and posterior calf (S1); therapeutic dose 1500.  Left lateral buttock, anterior-mid thigh(S2); therapeutic dose 1100.   Therapy end time: 1415  Patient tolerated treatment well.  Pain reported at start of therapy session was about a 5/10.  Pain reported as \"about the same´´ at end of therapy session.  Leads were removed and patient left the office without any difficulty.  Patient will return tomorrow for additional treatment.   60 min total scrambler treatment time.    "

## 2024-02-01 ENCOUNTER — OFFICE VISIT (OUTPATIENT)
Dept: PAIN MEDICINE | Facility: CLINIC | Age: 69
End: 2024-02-01
Payer: MEDICARE

## 2024-02-01 VITALS — WEIGHT: 113 LBS | HEIGHT: 63 IN | BODY MASS INDEX: 20.02 KG/M2

## 2024-02-01 DIAGNOSIS — M79.2 NEUROPATHIC PAIN: ICD-10-CM

## 2024-02-01 DIAGNOSIS — G57.72 COMPLEX REGIONAL PAIN SYNDROME TYPE 2 OF LEFT LOWER EXTREMITY: Primary | ICD-10-CM

## 2024-02-01 DIAGNOSIS — G03.9 ARACHNOIDITIS (HHS-HCC): ICD-10-CM

## 2024-02-01 PROCEDURE — 1036F TOBACCO NON-USER: CPT | Performed by: STUDENT IN AN ORGANIZED HEALTH CARE EDUCATION/TRAINING PROGRAM

## 2024-02-01 PROCEDURE — 1160F RVW MEDS BY RX/DR IN RCRD: CPT | Performed by: STUDENT IN AN ORGANIZED HEALTH CARE EDUCATION/TRAINING PROGRAM

## 2024-02-01 PROCEDURE — 99215 OFFICE O/P EST HI 40 MIN: CPT | Performed by: STUDENT IN AN ORGANIZED HEALTH CARE EDUCATION/TRAINING PROGRAM

## 2024-02-01 PROCEDURE — 1159F MED LIST DOCD IN RCRD: CPT | Performed by: STUDENT IN AN ORGANIZED HEALTH CARE EDUCATION/TRAINING PROGRAM

## 2024-02-01 PROCEDURE — 1125F AMNT PAIN NOTED PAIN PRSNT: CPT | Performed by: STUDENT IN AN ORGANIZED HEALTH CARE EDUCATION/TRAINING PROGRAM

## 2024-02-01 ASSESSMENT — PAIN SCALES - GENERAL: PAINLEVEL_OUTOF10: 5 - MODERATE PAIN

## 2024-02-01 NOTE — PROGRESS NOTES
Subjective   Patient ID: Lovely Luna is a 68 y.o. female who presents for Scrambler Therapy.    HPI 69 YO Female with a longstanding history of Arachnoiditis, CRPS of the pelvis and thigh, Sacral Pain, Tarlov Cysts and LLE pain and numbness. She has tried multiple interventions (injections, PT, etc) to help with the pain but has not had any lasting success. Lovely presents today for her 4th Scrambler Therapy during which we will be targeting the left sided L4, L5, S1 and S2. She does report that she has a slight decrease in allodynia to the lateral aspect of the LLE.     Review of Systems Unless noted in the HPI all other systems have been reviewed and are negative for complaint    Objective   Physical Exam  General- No acute distress, well appearing and well nourished.   Eyes Conjunctiva and lids: No erythema, swelling or discharge  Neck - Supple, no cervical lymphadenopathy.   Pulmonary - Respiratory effort: Normal respiration.   Cardiovascular - Normal rate and rhythm.  Examination of extremities for edema and/or varicosities: No peripheral edema  Abdomen: Soft, Non-tender, non-distended, no abdominal masses.   Musculoskeletal - Lumbar spine with spasms and tender to palpation; especially over the sacrum. Left hip pain and tenderness noted to the the lateral hip and thigh. Decreased ROM and strength noted. Edema to the LLE noted.   Skin - Skin and subcutaneous tissue: Normal without rashes or lesions.  Neurologic - Reflexes: Normal. Coordination: Normal gait   Psychiatric - Orientation to person, place, and time: Normal. Mood and affect: Normal.    Assessment/Plan   Problem List Items Addressed This Visit       Chronic idiopathic pain syndrome    Complex regional pain syndrome type 2 of left lower extremity - Primary     TREATMENT PLAN:  Day 4 of Scrambler Therapy.   Verbal consent obtained.  Session initiated by myself with proper electrode/lead placement to applicable dermatomes.   Patient tolerated  "treatment well (see \"procedure note\" below).  Encouraged patient to keep a diary or log of symptoms to monitor for any changes in pain, sensation, etc.   >30 minutes spent face-to-face with patient.     PROCEDURE NOTE:  Treatment 4 with Scrambler therapy was initiated by myself.   Verbal consent obtained from patient.  Therapy start time: 1330  Leads were applied to:  LLE:  Anterior above the knee cap and directly below knee cap (L4); therapeutic dose 1400.  Top of foot and lateral leg above malleolus (L5); therapeutic dose 1400.  Lateral side left more proximal to knee and posterior calf (S1); therapeutic dose 1500.  Left lateral buttock, anterior-mid thigh(S2); therapeutic dose 1100.   Therapy end time: 1430  Patient tolerated treatment well.  Pain reported at start of therapy session was about a 5/10.  Pain reported as \"about the same´´ at end of therapy session.  Leads were removed and patient left the office without any difficulty.  Patient will return on 2/5/24 for additional treatment.   60 min total scrambler treatment time.    "

## 2024-02-05 ENCOUNTER — OFFICE VISIT (OUTPATIENT)
Dept: PAIN MEDICINE | Facility: CLINIC | Age: 69
End: 2024-02-05
Payer: MEDICARE

## 2024-02-05 VITALS
RESPIRATION RATE: 18 BRPM | HEIGHT: 63 IN | WEIGHT: 113 LBS | HEART RATE: 71 BPM | DIASTOLIC BLOOD PRESSURE: 78 MMHG | SYSTOLIC BLOOD PRESSURE: 130 MMHG | BODY MASS INDEX: 20.02 KG/M2

## 2024-02-05 DIAGNOSIS — G57.72 COMPLEX REGIONAL PAIN SYNDROME TYPE 2 OF LEFT LOWER EXTREMITY: Primary | ICD-10-CM

## 2024-02-05 PROCEDURE — 1125F AMNT PAIN NOTED PAIN PRSNT: CPT | Performed by: ANESTHESIOLOGY

## 2024-02-05 PROCEDURE — 1159F MED LIST DOCD IN RCRD: CPT | Performed by: ANESTHESIOLOGY

## 2024-02-05 PROCEDURE — 99215 OFFICE O/P EST HI 40 MIN: CPT | Performed by: ANESTHESIOLOGY

## 2024-02-05 PROCEDURE — 1160F RVW MEDS BY RX/DR IN RCRD: CPT | Performed by: ANESTHESIOLOGY

## 2024-02-05 PROCEDURE — 1036F TOBACCO NON-USER: CPT | Performed by: ANESTHESIOLOGY

## 2024-02-05 ASSESSMENT — PAIN DESCRIPTION - DESCRIPTORS: DESCRIPTORS: OTHER (COMMENT);BURNING

## 2024-02-05 ASSESSMENT — PAIN - FUNCTIONAL ASSESSMENT: PAIN_FUNCTIONAL_ASSESSMENT: 0-10

## 2024-02-05 ASSESSMENT — PAIN SCALES - GENERAL
PAINLEVEL: 7
PAINLEVEL_OUTOF10: 7

## 2024-02-05 NOTE — PROGRESS NOTES
"TREATMENT PLAN:  Day 5 of Scrambler Therapy.   Verbal consent obtained.  Session initiated by myself with proper electrode/lead placement to applicable dermatomes.   Patient tolerated treatment well (see \"procedure note\" below).  Encouraged patient to keep a diary or log of symptoms to monitor for any changes in pain, sensation, etc.   >30 minutes spent face-to-face with patient.     /78   Pulse 71   Resp 18   Ht 1.6 m (5' 3\")   Wt 51.3 kg (113 lb)   BMI 20.02 kg/m²     PROCEDURE NOTE:  Treatment #5 with Scrambler therapy was initiated by myself.   Verbal consent obtained from patient.  Therapy start time: 13:25    Leads were applied to:  Anterior above the knee cap and directly below knee cap (L4); therapeutic dose 1200.  Top of foot and lateral leg above malleolus (L5); therapeutic dose 1300.  Lateral side left more proximal to knee and posterior calf (S1); therapeutic dose 1300.  Left mid buttock (pt request to move medially), anterior-mid thigh (S2); therapeutic dose 1300.     Therapy end time: 14:25    Patient tolerated treatment well.  Pain reported at start of therapy session was about a 7/10.  Post-session pain score: 7/10    Leads were removed and patient left the office without any difficulty. Pt was discharged home in stable condition.    Patient will return tomorrow for additional treatment.   60 min total scrambler treatment time.      Rosa M Madsen MD  Anesthesiologist & Interventional Pain Physician   [facility] [place]  O: [phone]  F: [phone]  1:25 PM  02/05/24    "

## 2024-02-06 ENCOUNTER — OFFICE VISIT (OUTPATIENT)
Dept: PAIN MEDICINE | Facility: CLINIC | Age: 69
End: 2024-02-06
Payer: MEDICARE

## 2024-02-06 VITALS — BODY MASS INDEX: 20.02 KG/M2 | HEIGHT: 63 IN | WEIGHT: 113 LBS

## 2024-02-06 DIAGNOSIS — G03.9 ARACHNOIDITIS (HHS-HCC): ICD-10-CM

## 2024-02-06 DIAGNOSIS — G89.29 CHRONIC IDIOPATHIC PAIN SYNDROME: Primary | ICD-10-CM

## 2024-02-06 DIAGNOSIS — G90.529 COMPLEX REGIONAL PAIN SYNDROME TYPE 1 AFFECTING PELVIC REGION AND THIGH: ICD-10-CM

## 2024-02-06 PROCEDURE — 99215 OFFICE O/P EST HI 40 MIN: CPT | Performed by: NURSE PRACTITIONER

## 2024-02-06 PROCEDURE — 1125F AMNT PAIN NOTED PAIN PRSNT: CPT | Performed by: NURSE PRACTITIONER

## 2024-02-06 PROCEDURE — 1159F MED LIST DOCD IN RCRD: CPT | Performed by: NURSE PRACTITIONER

## 2024-02-06 PROCEDURE — 1036F TOBACCO NON-USER: CPT | Performed by: NURSE PRACTITIONER

## 2024-02-06 PROCEDURE — 1160F RVW MEDS BY RX/DR IN RCRD: CPT | Performed by: NURSE PRACTITIONER

## 2024-02-06 NOTE — PROGRESS NOTES
Subjective   Patient ID: Lovely Luna is a 68 y.o. female who presents for Scrambler Therapy.    HPI 69 YO Female with a longstanding history of Arachnoiditis, CRPS of the pelvis and thigh, Sacral Pain, Tarlov Cysts and LLE pain and numbness. She has tried multiple interventions (injections, PT, etc) to help with the pain but has not had any lasting success. Lovely presents today for her 6th Scrambler Therapy during which we will be targeting the left sided L4, L5, S1 and S2. She does report that she has a slight decrease in allodynia to the lateral aspect of the LLE.     Review of Systems Unless noted in the HPI all other systems have been reviewed and are negative for complaint    Objective   Physical Exam  General- No acute distress, well appearing and well nourished.   Eyes Conjunctiva and lids: No erythema, swelling or discharge  Neck - Supple, no cervical lymphadenopathy.   Pulmonary - Respiratory effort: Normal respiration.   Cardiovascular - Normal rate and rhythm.  Examination of extremities for edema and/or varicosities: No peripheral edema  Abdomen: Soft, Non-tender, non-distended, no abdominal masses.   Musculoskeletal - Lumbar spine with spasms and tender to palpation; especially over the sacrum. Left hip pain and tenderness noted to the the lateral hip and thigh. Decreased ROM and strength noted. Edema to the LLE noted.   Skin - Skin and subcutaneous tissue: Normal without rashes or lesions.  Neurologic - Reflexes: Normal. Coordination: Normal gait   Psychiatric - Orientation to person, place, and time: Normal. Mood and affect: Normal.    Assessment/Plan   Problem List Items Addressed This Visit       Chronic idiopathic pain syndrome    Complex regional pain syndrome type 2 of left lower extremity - Primary     TREATMENT PLAN:  Day 6 of Scrambler Therapy.   Verbal consent obtained.  Session initiated by myself with proper electrode/lead placement to applicable dermatomes.   Patient tolerated  "treatment well (see \"procedure note\" below).  Encouraged patient to keep a diary or log of symptoms to monitor for any changes in pain, sensation, etc.   >30 minutes spent face-to-face with patient.     PROCEDURE NOTE:  Treatment 6 with Scrambler therapy was initiated by myself.   Verbal consent obtained from patient.  Therapy start time: 1210  Leads were applied to:  LLE:  Anterior above the knee cap and directly below knee cap (L4); therapeutic dose 1400.  Top of foot and lateral leg above malleolus (L5); therapeutic dose 1400  Lateral side left more proximal to knee and posterior calf (S1); therapeutic dose 1500.  Left medial buttock one electrode on top of the other with approximately 1-2 fingerbreadth's  (S2); therapeutic dose 1100.  Therapy end time: 1310  Patient tolerated treatment well.  Pain reported at start of therapy session was about a 7-8/10.  Pain reported as \"about the same´´ at end of therapy session.  Leads were removed and patient left the office without any difficulty.  60 min total scrambler treatment time.    "

## 2024-02-07 ENCOUNTER — OFFICE VISIT (OUTPATIENT)
Dept: PAIN MEDICINE | Facility: CLINIC | Age: 69
End: 2024-02-07
Payer: MEDICARE

## 2024-02-07 VITALS
HEIGHT: 63 IN | WEIGHT: 113 LBS | DIASTOLIC BLOOD PRESSURE: 90 MMHG | BODY MASS INDEX: 20.02 KG/M2 | HEART RATE: 74 BPM | RESPIRATION RATE: 18 BRPM | SYSTOLIC BLOOD PRESSURE: 147 MMHG

## 2024-02-07 DIAGNOSIS — G57.72 COMPLEX REGIONAL PAIN SYNDROME TYPE 2 OF LEFT LOWER EXTREMITY: Primary | ICD-10-CM

## 2024-02-07 PROCEDURE — 1125F AMNT PAIN NOTED PAIN PRSNT: CPT | Performed by: ANESTHESIOLOGY

## 2024-02-07 PROCEDURE — 99215 OFFICE O/P EST HI 40 MIN: CPT | Performed by: ANESTHESIOLOGY

## 2024-02-07 PROCEDURE — 1159F MED LIST DOCD IN RCRD: CPT | Performed by: ANESTHESIOLOGY

## 2024-02-07 PROCEDURE — 1160F RVW MEDS BY RX/DR IN RCRD: CPT | Performed by: ANESTHESIOLOGY

## 2024-02-07 PROCEDURE — 1036F TOBACCO NON-USER: CPT | Performed by: ANESTHESIOLOGY

## 2024-02-07 ASSESSMENT — PATIENT HEALTH QUESTIONNAIRE - PHQ9
1. LITTLE INTEREST OR PLEASURE IN DOING THINGS: NOT AT ALL
SUM OF ALL RESPONSES TO PHQ9 QUESTIONS 1 AND 2: 0
2. FEELING DOWN, DEPRESSED OR HOPELESS: NOT AT ALL

## 2024-02-07 ASSESSMENT — PAIN - FUNCTIONAL ASSESSMENT: PAIN_FUNCTIONAL_ASSESSMENT: 0-10

## 2024-02-07 ASSESSMENT — PAIN SCALES - GENERAL
PAINLEVEL: 7
PAINLEVEL_OUTOF10: 7

## 2024-02-07 ASSESSMENT — PAIN DESCRIPTION - DESCRIPTORS: DESCRIPTORS: BURNING;OTHER (COMMENT)

## 2024-02-07 NOTE — PROGRESS NOTES
"TREATMENT PLAN:  Day 7 of Scrambler Therapy.   Verbal consent obtained.  Session initiated by myself with proper electrode/lead placement to applicable dermatomes.   Patient tolerated treatment well (see \"procedure note\" below).  Encouraged patient to keep a diary or log of symptoms to monitor for any changes in pain, sensation, etc.   >30 minutes spent face-to-face with patient.     /90   Pulse 74   Resp 18   Ht 1.6 m (5' 3\")   Wt 51.3 kg (113 lb)   BMI 20.02 kg/m²     PROCEDURE NOTE:  Treatment 7 with Scrambler therapy was initiated by myself.   Verbal consent obtained from patient.  Therapy start time: 1:19 PM    Leads were applied to:  -Anterior above the knee cap and directly below knee cap (L4); therapeutic dose 1500.  -Top of foot and lateral leg above malleolus (L5); therapeutic dose 1500  -Lateral side left more proximal to knee and posterior calf (S1); therapeutic dose 1400.  -Left medial buttock one electrode on top of the other with approximately 1-2 fingerbreadth's  (S2); therapeutic dose 1200.    Therapy end time: 2:19 PM    Patient tolerated treatment well.  Pain reported at start of therapy session was about a 7/10.  Post-session pain score: 7/10    Leads were removed and patient left the office without any difficulty. Pt was discharged home in stable condition.    Patient will return tomorrow for additional treatment.   60 min total scrambler treatment time.      Rosa M Madsen MD  Anesthesiologist & Interventional Pain Physician   [facility] [place]  O: [phone]  F: [phone]  1:13 PM  02/07/24    "

## 2024-02-08 ENCOUNTER — APPOINTMENT (OUTPATIENT)
Dept: PAIN MEDICINE | Facility: CLINIC | Age: 69
End: 2024-02-08
Payer: COMMERCIAL

## 2024-06-24 ENCOUNTER — APPOINTMENT (OUTPATIENT)
Dept: CARDIOLOGY | Facility: CLINIC | Age: 69
End: 2024-06-24
Payer: MEDICARE

## 2024-07-11 ENCOUNTER — OFFICE VISIT (OUTPATIENT)
Dept: CARDIOLOGY | Facility: CLINIC | Age: 69
End: 2024-07-11
Payer: MEDICARE

## 2024-07-11 VITALS
SYSTOLIC BLOOD PRESSURE: 126 MMHG | WEIGHT: 116 LBS | DIASTOLIC BLOOD PRESSURE: 70 MMHG | BODY MASS INDEX: 19.81 KG/M2 | HEIGHT: 64 IN | OXYGEN SATURATION: 99 % | HEART RATE: 65 BPM

## 2024-07-11 DIAGNOSIS — R06.09 DOE (DYSPNEA ON EXERTION): Primary | ICD-10-CM

## 2024-07-11 DIAGNOSIS — I48.0 PAROXYSMAL ATRIAL FIBRILLATION (MULTI): ICD-10-CM

## 2024-07-11 DIAGNOSIS — I10 PRIMARY HYPERTENSION: ICD-10-CM

## 2024-07-11 PROCEDURE — 3074F SYST BP LT 130 MM HG: CPT | Performed by: STUDENT IN AN ORGANIZED HEALTH CARE EDUCATION/TRAINING PROGRAM

## 2024-07-11 PROCEDURE — 1036F TOBACCO NON-USER: CPT | Performed by: STUDENT IN AN ORGANIZED HEALTH CARE EDUCATION/TRAINING PROGRAM

## 2024-07-11 PROCEDURE — 1159F MED LIST DOCD IN RCRD: CPT | Performed by: STUDENT IN AN ORGANIZED HEALTH CARE EDUCATION/TRAINING PROGRAM

## 2024-07-11 PROCEDURE — 3078F DIAST BP <80 MM HG: CPT | Performed by: STUDENT IN AN ORGANIZED HEALTH CARE EDUCATION/TRAINING PROGRAM

## 2024-07-11 PROCEDURE — 99215 OFFICE O/P EST HI 40 MIN: CPT | Performed by: STUDENT IN AN ORGANIZED HEALTH CARE EDUCATION/TRAINING PROGRAM

## 2024-07-11 PROCEDURE — 93005 ELECTROCARDIOGRAM TRACING: CPT | Performed by: STUDENT IN AN ORGANIZED HEALTH CARE EDUCATION/TRAINING PROGRAM

## 2024-07-11 NOTE — PATIENT INSTRUCTIONS
We will obtain a transthoracic echocardiogram for structural evaluation including ejection fraction, assessment of regional wall motion abnormalities or valvular disease, and further evaluation of hemodynamics.    Please continue current cardiac medications.    Will plan to check blood work in the  lab including comprehensive metabolic panel, thyroid function, CBC, lipid panel, BNP level.    Please followup with me in Cardiology clinic within the next 4-5 months.  Please return to clinic sooner or seek emergent care if your symptoms reoccur or worsen.

## 2024-07-11 NOTE — PROGRESS NOTES
Follow-up/Follow-up (6-7 mo/Unsure if she wants to proceed for LAAC) and Shortness of Breath     HPI:    Lovely Luna is a 69 y.o. female with pertinent history of chronic pain syndrome, varicose veins, May Thurner syndrome, left bundle branch block on ECG performed 11/28/2022, preserved EF on echo 12/5/22, no clear ischemia on nuclear pharmacologic stress performed 12/5/22 that was complicated by by approximately 1-2 minutes of persistent SVT likely atrial fibrillation with rate of 170 bpm, less than 1% were about 59 minutes of atrial fibrillation burden and event monitor performed December 2022, atrial fibrillation not on Eliquis presents to cardiology clinic for follow-up.    She is accompanied by her  who is a physician.  She is doing relatively well with stable back pain.  She does note some increased dyspnea on exertion over the last few months.  She has minimal palpitations and has decided to stop taking Eliquis although she does understand there is a bit of an elevated risk for CVA.  We reviewed and discussed her prior blood work including lipid panel and BNP level.  No exacerbating or relieving factors.  Patient denies chest pain and angina.  Pt denies orthopnea, and paroxysmal nocturnal dyspnea.  Pt denies worsening lower extremity edema.  Pt denies syncope.  No recent falls.  No fever or chills.  No cough.  No change in bowel or bladder habits.  No sick contacts.  No recent travel.    12 point review of systems including (Constitutional, Eyes, ENMT, Respiratory, Cardiac, Gastrointestinal, Neurological, Psychiatric, and Hematologic) was performed and is otherwise negative.    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    Past surgical history reviewed:   has a past surgical history that includes  section, classic (2017); Oophorectomy (2017); Laparoscopy diagnostic / biopsy / aspiration / lysis (2018); Other surgical history (2018); and MR angio pelvis w and wo IV contrast (2021).    Social history reviewed:   reports that she has never smoked. She has never used smokeless tobacco. She reports that she does not drink alcohol and does not use drugs.     Family history reviewed:    Family History   Problem Relation Name Age of Onset    Stroke Mother      Coronary artery disease Father      Cancer Father         Allergies reviewed: Patient has no known allergies.     Medications reviewed:   Current Outpatient Medications   Medication Instructions    acetaminophen (TYLENOL) 325 mg, oral    apixaban (ELIQUIS) 5 mg, oral, 2 times daily    cholecalciferol (Vitamin D-3) 50 mcg (2,000 unit) capsule 1 capsule, oral, Every morning    DENOSUMAB SUBQ 1 mL, subcutaneous, Every 6 months    gabapentin (Neurontin) 100 mg capsule oral, 2 times daily, TAKE 2 CAPSULES BY MOUTH TWICE DAILY and TAKE 3 CAPSULES BY MOUTH EVERY NIGHT AT BEDTIME<BR>    LORazepam (ATIVAN) 0.5 mg, oral, 3 times daily PRN    losartan (COZAAR) 12.5 mg, oral, Daily    metoprolol tartrate (LOPRESSOR) 12.5 mg, oral, 2 times daily, Take 25 mg by mouth in the morning and 25 mg before bedtime.    multivitamin tablet 1 tablet, oral, Every morning        Vitals reviewed: Visit Vitals  /70   Pulse 65       Physical Exam:   General:  Patient is awake, alert, and oriented.  Patient is in no acute distress.  HEENT:  Pupils equal and  "reactive.  Normocephalic.  Moist mucosa.    Neck:  No thyromegaly.  Normal Jugular Venous Pressure.  Cardiovascular:  Regular rate and rhythm.  Normal S1 and S2.  1/6 WILMAN.  Pulmonary:  Clear to auscultation bilaterally.  Abdomen:  Soft. Non-tender.   Non-distended.  Positive bowel sounds.  Lower Extremities:  2+ pedal pulses. No LE edema.  Neurologic:  Cranial nerves intact.  No focal deficit.   Skin: Skin warm and dry, normal skin turgor.   Psychiatric: Normal affect.    Last Labs:  CBC -      Lab Results   Component Value Date    WBC 11.3 11/28/2022    HGB 14.1 11/28/2022    HCT 42.4 11/28/2022     11/28/2022        CMP-  Lab Results   Component Value Date    GLUCOSE 85 11/28/2022     11/28/2022    K 4.5 11/28/2022     11/28/2022    CO2 30 11/28/2022    ANIONGAP 12 11/28/2022    BUN 10 11/28/2022    CREATININE 0.55 11/28/2022    CALCIUM 9.2 11/28/2022    PROT 6.0 (L) 05/09/2018    ALBUMIN 3.8 05/09/2018    AST 50 (H) 05/09/2018    ALT 56 (H) 05/09/2018    ALKPHOS 50 05/09/2018    BILITOT 1.0 05/09/2018        LIPIDS-  No results found for: \"CHOL\", \"TRIG\", \"HDL\", \"CHHDL\", \"VLDL\"     OTHERS-  Lab Results   Component Value Date    BNP 43 12/06/2022        I personally reviewed the patient's recent vitals, labs, medications, orders, EKGs, pertinent cardiac imaging/ echocardiography and event monitor.    Assessment and Plan:    Lovely Luna is a 69 y.o. female with pertinent history of chronic pain syndrome, varicose veins, May Thurner syndrome, left bundle branch block on ECG performed 11/28/2022, preserved EF on echo 12/5/22, no clear ischemia on nuclear pharmacologic stress performed 12/5/22 that was complicated by by approximately 1-2 minutes of persistent SVT likely atrial fibrillation with rate of 170 bpm, less than 1% were about 59 minutes of atrial fibrillation burden and event monitor performed December 2022, atrial fibrillation not on Eliquis presents to cardiology clinic for " follow-up.  She is accompanied by her  who is a physician.  She is doing relatively well with stable back pain.  She does note some increased dyspnea on exertion over the last few months.  She has minimal palpitations and has decided to stop taking Eliquis although she does understand there is a bit of an elevated risk for CVA.  We reviewed and discussed her prior blood work including lipid panel and BNP level.      ECG was performed in clinic.    We will obtain a transthoracic echocardiogram for structural evaluation including ejection fraction, assessment of regional wall motion abnormalities or valvular disease, and further evaluation of hemodynamics.    Please continue current cardiac medications.    Will plan to check blood work in the  lab including comprehensive metabolic panel, thyroid function, CBC, lipid panel, BNP level.    Please followup with me in Cardiology clinic within the next 4-5 months.  Please return to clinic sooner or seek emergent care if your symptoms reoccur or worsen.    Thank you for allowing me to participate in their care.  Please feel free to call me with any further questions or concerns.      Silvestre Rico MD, FACC, [facility]  Division of Cardiovascular Medicine  System Director, Nuclear Cardiology   Medical Director, [facility] [place], [facility]   Clinical , [facility] [employer]  [email or web address]   Office:  [phone]

## 2024-07-16 ENCOUNTER — HOSPITAL ENCOUNTER (OUTPATIENT)
Dept: RADIOLOGY | Facility: CLINIC | Age: 69
Discharge: HOME | End: 2024-07-16
Payer: MEDICARE

## 2024-07-16 VITALS — BODY MASS INDEX: 19.8 KG/M2 | HEIGHT: 64 IN | WEIGHT: 115.96 LBS

## 2024-07-16 DIAGNOSIS — Z12.31 ENCOUNTER FOR SCREENING MAMMOGRAM FOR MALIGNANT NEOPLASM OF BREAST: ICD-10-CM

## 2024-07-16 PROCEDURE — 77063 BREAST TOMOSYNTHESIS BI: CPT | Performed by: RADIOLOGY

## 2024-07-16 PROCEDURE — 77063 BREAST TOMOSYNTHESIS BI: CPT

## 2024-07-16 PROCEDURE — 77067 SCR MAMMO BI INCL CAD: CPT | Performed by: RADIOLOGY

## 2024-07-23 ENCOUNTER — APPOINTMENT (OUTPATIENT)
Dept: RADIOLOGY | Facility: CLINIC | Age: 69
End: 2024-07-23
Payer: MEDICARE

## 2024-07-24 LAB
ATRIAL RATE: 60 BPM
P AXIS: 60 DEGREES
P OFFSET: 189 MS
P ONSET: 140 MS
PR INTERVAL: 138 MS
Q ONSET: 209 MS
QRS COUNT: 10 BEATS
QRS DURATION: 154 MS
QT INTERVAL: 464 MS
QTC CALCULATION(BAZETT): 464 MS
QTC FREDERICIA: 464 MS
R AXIS: 22 DEGREES
T AXIS: 96 DEGREES
T OFFSET: 441 MS
VENTRICULAR RATE: 60 BPM

## 2024-07-29 ENCOUNTER — LAB (OUTPATIENT)
Dept: LAB | Facility: LAB | Age: 69
End: 2024-07-29
Payer: MEDICARE

## 2024-07-29 DIAGNOSIS — E55.9 VITAMIN D DEFICIENCY, UNSPECIFIED: Primary | ICD-10-CM

## 2024-07-29 DIAGNOSIS — M81.0 AGE-RELATED OSTEOPOROSIS WITHOUT CURRENT PATHOLOGICAL FRACTURE: ICD-10-CM

## 2024-07-29 DIAGNOSIS — R73.9 HYPERGLYCEMIA, UNSPECIFIED: ICD-10-CM

## 2024-07-29 DIAGNOSIS — I10 PRIMARY HYPERTENSION: ICD-10-CM

## 2024-07-29 DIAGNOSIS — R06.09 DOE (DYSPNEA ON EXERTION): ICD-10-CM

## 2024-07-29 DIAGNOSIS — I48.0 PAROXYSMAL ATRIAL FIBRILLATION (MULTI): ICD-10-CM

## 2024-07-29 LAB
25(OH)D3 SERPL-MCNC: 66 NG/ML (ref 30–100)
ATRIAL RATE: 60 BPM
BNP SERPL-MCNC: 106 PG/ML (ref 0–99)
EST. AVERAGE GLUCOSE BLD GHB EST-MCNC: 100 MG/DL
HBA1C MFR BLD: 5.1 %
P AXIS: 60 DEGREES
P OFFSET: 189 MS
P ONSET: 140 MS
PR INTERVAL: 138 MS
Q ONSET: 209 MS
QRS COUNT: 10 BEATS
QRS DURATION: 154 MS
QT INTERVAL: 464 MS
QTC CALCULATION(BAZETT): 464 MS
QTC FREDERICIA: 464 MS
R AXIS: 22 DEGREES
T AXIS: 96 DEGREES
T OFFSET: 441 MS
TSH SERPL-ACNC: 0.7 MIU/L (ref 0.44–3.98)
VENTRICULAR RATE: 60 BPM

## 2024-07-29 PROCEDURE — 83036 HEMOGLOBIN GLYCOSYLATED A1C: CPT

## 2024-07-29 PROCEDURE — 80061 LIPID PANEL: CPT

## 2024-07-29 PROCEDURE — 36415 COLL VENOUS BLD VENIPUNCTURE: CPT

## 2024-07-29 PROCEDURE — 85025 COMPLETE CBC W/AUTO DIFF WBC: CPT

## 2024-07-29 PROCEDURE — 84443 ASSAY THYROID STIM HORMONE: CPT

## 2024-07-29 PROCEDURE — 82306 VITAMIN D 25 HYDROXY: CPT

## 2024-07-29 PROCEDURE — 80053 COMPREHEN METABOLIC PANEL: CPT

## 2024-07-29 PROCEDURE — 83880 ASSAY OF NATRIURETIC PEPTIDE: CPT

## 2024-07-29 PROCEDURE — 82248 BILIRUBIN DIRECT: CPT

## 2024-07-30 ENCOUNTER — TELEPHONE (OUTPATIENT)
Dept: CARDIOLOGY | Facility: CLINIC | Age: 69
End: 2024-07-30
Payer: MEDICARE

## 2024-07-30 LAB
BASOPHILS # BLD AUTO: 0.06 X10*3/UL (ref 0–0.1)
BASOPHILS NFR BLD AUTO: 0.5 %
EOSINOPHIL # BLD AUTO: 0.09 X10*3/UL (ref 0–0.4)
EOSINOPHIL NFR BLD AUTO: 0.7 %
ERYTHROCYTE [DISTWIDTH] IN BLOOD BY AUTOMATED COUNT: 13.3 % (ref 11.5–14.5)
HCT VFR BLD AUTO: 42.5 % (ref 36–46)
HGB BLD-MCNC: 13.9 G/DL (ref 12–16)
IMM GRANULOCYTES # BLD AUTO: 0.02 X10*3/UL (ref 0–0.5)
IMM GRANULOCYTES NFR BLD AUTO: 0.2 % (ref 0–0.9)
LYMPHOCYTES # BLD AUTO: 7.52 X10*3/UL (ref 0.8–3)
LYMPHOCYTES NFR BLD AUTO: 57.6 %
MCH RBC QN AUTO: 30.5 PG (ref 26–34)
MCHC RBC AUTO-ENTMCNC: 32.7 G/DL (ref 32–36)
MCV RBC AUTO: 93 FL (ref 80–100)
MONOCYTES # BLD AUTO: 0.39 X10*3/UL (ref 0.05–0.8)
MONOCYTES NFR BLD AUTO: 3 %
NEUTROPHILS # BLD AUTO: 4.97 X10*3/UL (ref 1.6–5.5)
NEUTROPHILS NFR BLD AUTO: 38 %
NRBC BLD-RTO: 0 /100 WBCS (ref 0–0)
PLATELET # BLD AUTO: 233 X10*3/UL (ref 150–450)
RBC # BLD AUTO: 4.55 X10*6/UL (ref 4–5.2)
RBC MORPH BLD: NORMAL
WBC # BLD AUTO: 13.1 X10*3/UL (ref 4.4–11.3)

## 2024-07-30 NOTE — TELEPHONE ENCOUNTER
Patient called with concerns of lab orders being incorrect. Pt has paper order from PCP in chart that was ordered as CBC w/differential. The labs were ran as a regular CBC. I spoke with client services downtown and they said they will work on getting it added on. I relayed this information to the patient and she verbalized understanding. She will call back with further questions or concerns.

## 2024-07-31 LAB
ALBUMIN SERPL BCP-MCNC: 4.3 G/DL (ref 3.4–5)
ALP SERPL-CCNC: 37 U/L (ref 33–136)
ALT SERPL W P-5'-P-CCNC: 11 U/L (ref 7–45)
ANION GAP SERPL CALC-SCNC: 18 MMOL/L (ref 10–20)
AST SERPL W P-5'-P-CCNC: 20 U/L (ref 9–39)
BILIRUB DIRECT SERPL-MCNC: 0.2 MG/DL (ref 0–0.3)
BILIRUB SERPL-MCNC: 0.9 MG/DL (ref 0–1.2)
BUN SERPL-MCNC: 13 MG/DL (ref 6–23)
CALCIUM SERPL-MCNC: 9.4 MG/DL (ref 8.6–10.6)
CHLORIDE SERPL-SCNC: 99 MMOL/L (ref 98–107)
CHOLEST SERPL-MCNC: 186 MG/DL (ref 0–199)
CHOLESTEROL/HDL RATIO: 2.1
CO2 SERPL-SCNC: 24 MMOL/L (ref 21–32)
CREAT SERPL-MCNC: 0.57 MG/DL (ref 0.5–1.05)
EGFRCR SERPLBLD CKD-EPI 2021: >90 ML/MIN/1.73M*2
GLUCOSE SERPL-MCNC: 87 MG/DL (ref 74–99)
HDLC SERPL-MCNC: 87.6 MG/DL
LDLC SERPL CALC-MCNC: 88 MG/DL
NON HDL CHOLESTEROL: 98 MG/DL (ref 0–149)
POTASSIUM SERPL-SCNC: 4.3 MMOL/L (ref 3.5–5.3)
PROT SERPL-MCNC: 6.5 G/DL (ref 6.4–8.2)
SODIUM SERPL-SCNC: 137 MMOL/L (ref 136–145)
TRIGL SERPL-MCNC: 54 MG/DL (ref 0–149)
VLDL: 11 MG/DL (ref 0–40)

## 2024-08-15 ENCOUNTER — APPOINTMENT (OUTPATIENT)
Dept: CARDIOLOGY | Facility: CLINIC | Age: 69
End: 2024-08-15
Payer: MEDICARE

## 2024-11-11 ENCOUNTER — APPOINTMENT (OUTPATIENT)
Dept: CARDIOLOGY | Facility: CLINIC | Age: 69
End: 2024-11-11
Payer: MEDICARE

## 2024-11-22 ENCOUNTER — OFFICE VISIT (OUTPATIENT)
Dept: ORTHOPEDIC SURGERY | Facility: CLINIC | Age: 69
End: 2024-11-22
Payer: MEDICARE

## 2024-11-22 ENCOUNTER — HOSPITAL ENCOUNTER (OUTPATIENT)
Dept: RADIOLOGY | Facility: CLINIC | Age: 69
Discharge: HOME | End: 2024-11-22
Payer: MEDICARE

## 2024-11-22 DIAGNOSIS — M79.642 LEFT HAND PAIN: Primary | ICD-10-CM

## 2024-11-22 DIAGNOSIS — M79.642 LEFT HAND PAIN: ICD-10-CM

## 2024-11-22 PROCEDURE — 73130 X-RAY EXAM OF HAND: CPT | Mod: LT

## 2024-11-22 PROCEDURE — 1159F MED LIST DOCD IN RCRD: CPT | Performed by: STUDENT IN AN ORGANIZED HEALTH CARE EDUCATION/TRAINING PROGRAM

## 2024-11-22 PROCEDURE — 99203 OFFICE O/P NEW LOW 30 MIN: CPT | Performed by: STUDENT IN AN ORGANIZED HEALTH CARE EDUCATION/TRAINING PROGRAM

## 2024-11-22 PROCEDURE — 73110 X-RAY EXAM OF WRIST: CPT | Mod: LT

## 2024-11-22 PROCEDURE — 1125F AMNT PAIN NOTED PAIN PRSNT: CPT | Performed by: STUDENT IN AN ORGANIZED HEALTH CARE EDUCATION/TRAINING PROGRAM

## 2024-11-22 ASSESSMENT — PAIN - FUNCTIONAL ASSESSMENT: PAIN_FUNCTIONAL_ASSESSMENT: 0-10

## 2024-11-22 ASSESSMENT — PAIN SCALES - GENERAL: PAINLEVEL_OUTOF10: 2

## 2025-02-13 NOTE — PROGRESS NOTES
History of Present Illness:  Chief Complaint   Patient presents with    Left Hand - New Patient Visit, Pain     Nov 19th had a fall that lead to injury ( last surg : 30 years ago with left middle finger with nerve repair ) ( NO MRI or INJ) last EMG was in 2012 ( RETIRED )       The patient presents today for evaluation of left basilar thumb pain and radial sided wrist pain.  Symptoms began November 19, 2024.  The patient states she had a fall which led to her pain.  Pain is worse with gripping, pinching and twisting.  Pain is worse with movement and better with rest.  She is denying new onset numbness and tingling.  She does note a remote history of surgery 30 years ago to her left middle finger for a nerve repair.  She notes that her pain she has not had any MRIs or injections.    No numbness or tingling.  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    Median arcuate ligament syndrome    Lymphocytosis (symptomatic)     Monoclonal B-cell lymphocytosis    Other specified postprocedural states 08/06/2018    History of repair of hiatal hernia    Pain in right hip 07/16/2021    Right hip pain    Personal history of other diseases of the female genital tract     History of endometriosis    Personal history of other endocrine, nutritional and metabolic disease     History of vitamin D deficiency    Personal history of other specified conditions 05/05/2017    History of diarrhea    Right upper quadrant pain 07/26/2021    Right upper quadrant abdominal pain    Unspecified pre-eclampsia, third trimester (HHS-HCC)     Pre-eclampsia in third trimester    Unspecified pre-eclampsia, unspecified trimester (HHS-HCC)     Preeclampsia       Medication Documentation Review Audit       Reviewed by Toya Owens LPN (Licensed Nurse) on 11/22/24 at 1534      Medication  Order Taking? Sig Documenting Provider Last Dose Status   acetaminophen (Tylenol) 325 mg capsule [id number] No Take 1 capsule (325 mg) by mouth. Historical Provider, MD Taking Active   apixaban (Eliquis) 5 mg tablet [id number] No Take 1 tablet (5 mg) by mouth 2 times a day.   Patient not taking: Reported on 7/11/2024    Silvestre MOURA MD Not Taking Active   cholecalciferol (Vitamin D-3) 50 mcg (2,000 unit) capsule [id number] No Take 1 capsule (50 mcg) by mouth once daily in the morning. Historical Provider, MD Taking Active   DENOSUMAB SUBQ [id number] No Inject 1 mL under the skin every 6 months. Historical Provider, MD Taking Active   gabapentin (Neurontin) 100 mg capsule [id number] No Take by mouth 2 times a day. TAKE 2 CAPSULES BY MOUTH TWICE DAILY and TAKE 3 CAPSULES BY MOUTH EVERY NIGHT AT BEDTIME Historical Provider, MD Taking Active   LORazepam (Ativan) 0.5 mg tablet [id number] No Take 1 tablet (0.5 mg) by mouth 3 times a day as needed for anxiety. Historical Provider, MD Taking Active   losartan (Cozaar) 25 mg tablet [id number] No Take 0.5 tablets (12.5 mg) by mouth once daily. Silvestre MOURA MD Taking Active   metoprolol tartrate (Lopressor) 25 mg tablet [id number] No Take 0.5 tablets (12.5 mg) by mouth 2 times a day. Take 25 mg by mouth in the morning and 25 mg before bedtime. Silvestre MOURA MD Taking Active   multivitamin tablet [id number] No Take 1 tablet by mouth once daily in the morning. Historical Provider, MD Taking Active                    No Known Allergies    Social History     Socioeconomic History    Marital status:      Spouse name: Not on file    Number of children: Not on file    Years of education: Not on file    Highest education level: Not on file   Occupational History    Not on file   Tobacco Use    Smoking status: Never    Smokeless tobacco: Never   Substance and Sexual Activity    Alcohol use: Never    Drug use: Never    Sexual activity: Defer   Other Topics Concern    Not on file    Social History Narrative    Not on file     Social Drivers of Health     Financial Resource Strain: Low Risk  (9/13/2024)    Received from [facility]    Overall Financial Resource Strain (CARDIA)     Difficulty of Paying Living Expenses: Not very hard   Food Insecurity: No Food Insecurity (9/13/2024)    Received from [facility]    Hunger Vital Sign     Worried About Running Out of Food in the Last Year: Never true     Ran Out of Food in the Last Year: Never true   Transportation Needs: No Transportation Needs (9/13/2024)    Received from [facility]    PRAPARE - Transportation     Lack of Transportation (Medical): No     Lack of Transportation (Non-Medical): No   Physical Activity: Sufficiently Active (9/13/2024)    Received from [facility]    Exercise Vital Sign     Days of Exercise per Week: 5 days     Minutes of Exercise per Session: 30 min   Stress: Patient Declined (9/13/2024)    Received from [facility]    [employer] [place] [employer] - Occupational Stress Questionnaire     Feeling of Stress : Patient declined   Social Connections: Patient Declined (9/13/2024)    Received from [facility]    Social Connection and Isolation Panel [NHANES]     Frequency of Communication with Friends and Family: Patient declined     Frequency of Social Gatherings with Friends and Family: Patient declined     Attends Mandaeism Services: Patient declined     Active Member of Clubs or Organizations: Patient declined     Attends Club or Organization Meetings: Patient declined     Marital Status: Patient declined   Intimate Partner Violence: Not At Risk (9/13/2024)    Received from [facility]    Humiliation, Afraid, Rape, and Kick questionnaire     Fear of Current or Ex-Partner: No     Emotionally Abused: No     Physically Abused: No     Sexually Abused: No   Housing Stability: Low Risk  (9/13/2024)    Received from [facility]    Housing Stability Vital Sign     Unable to Pay for Housing in the Last Year: No      Number of Times Moved in the Last Year: 0     Homeless in the Last Year: No       Past Surgical History:   Procedure Laterality Date     SECTION, CLASSIC  2017     Section    LAPAROSCOPY DIAGNOSTIC / BIOPSY / ASPIRATION / LYSIS  2018    Exploratory Laparoscopy    MR PELVIS ANGIO W AND WO IV CONTRAST  2021    MR PELVIS ANGIO W AND WO IV CONTRAST 2021 [facility]    OOPHORECTOMY  2017    Oophorectomy    OTHER SURGICAL HISTORY  2018    Repair Diaphragmatic Hernia          Review of Systems   GENERAL: Negative for malaise, significant weight loss, fever  MUSCULOSKELETAL: see HPI  NEURO:  see HPI     Physical Examination:  Constitutional: Appears well-developed and well-nourished.  Head: Normocephalic and atraumatic.  Eyes: EOMI grossly  Cardiovascular: Intact distal pulses.   Respiratory: Effort normal. No respiratory distress.  Neurologic: Alert and oriented to person, place, and time.  Skin: Skin is warm and dry.  Hematologic / Lymphatic: No lymphedema, lymphangitis.  Psychiatric: normal mood and affect. Behavior is normal.   Musculoskeletal:  left wrist/hand:  No obvious swelling or masses  No thenar atrophy  No atrophy noted of hypothenar eminence   Negative Yuliya's/Phalens  Sensation grossly intact to all digits  Moderate hyperextension of the MP joint of the thumb  Tenderness about thumb cmc joint with positive CMC grind.  No tenderness about first dorsal compartment/thumb A1 pulley region  Radial pulse palpable  Good capillary refill     Radiographs: XR of the left wrist and hand was taken reviewed in office today.  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    Assessment:  Patient with left basilar joint arthritis.     Plan:  Diagnosis was reviewed with patient.  We discussed treatments of thumb CMC arthritis.  Non-operative modalities include symptomatic splinting, anti-inflammatories, activity modifications, hand therapy and  corticosteroid injections.  We also discussed role of surgical intervention if conservative measures prove unsuccessful.     Bracing and diclofenac gel.

## 2025-09-08 ENCOUNTER — APPOINTMENT (OUTPATIENT)
Dept: ORTHOPEDIC SURGERY | Facility: CLINIC | Age: 70
End: 2025-09-08
Payer: MEDICARE